# Patient Record
Sex: FEMALE | Race: WHITE | NOT HISPANIC OR LATINO | URBAN - NONMETROPOLITAN AREA
[De-identification: names, ages, dates, MRNs, and addresses within clinical notes are randomized per-mention and may not be internally consistent; named-entity substitution may affect disease eponyms.]

---

## 2019-10-03 ENCOUNTER — APPOINTMENT (RX ONLY)
Dept: URBAN - NONMETROPOLITAN AREA CLINIC 13 | Facility: CLINIC | Age: 24
Setting detail: DERMATOLOGY
End: 2019-10-03

## 2019-10-03 DIAGNOSIS — L70.8 OTHER ACNE: ICD-10-CM

## 2019-10-03 PROCEDURE — ? COUNSELING

## 2019-10-03 PROCEDURE — 99202 OFFICE O/P NEW SF 15 MIN: CPT

## 2019-10-03 PROCEDURE — ? PRESCRIPTION

## 2019-10-03 RX ORDER — SPIRONOLACTONE 25 MG/1
3 TABLET, FILM COATED ORAL DAILY
Qty: 90 | Refills: 9 | Status: ERX | COMMUNITY
Start: 2019-10-03

## 2019-10-03 RX ADMIN — SPIRONOLACTONE 3: 25 TABLET, FILM COATED ORAL at 16:22

## 2019-10-03 ASSESSMENT — LOCATION SIMPLE DESCRIPTION DERM
LOCATION SIMPLE: LEFT CHEEK
LOCATION SIMPLE: CHIN

## 2019-10-03 ASSESSMENT — LOCATION ZONE DERM: LOCATION ZONE: FACE

## 2019-10-03 ASSESSMENT — LOCATION DETAILED DESCRIPTION DERM
LOCATION DETAILED: LEFT INFERIOR CENTRAL MALAR CHEEK
LOCATION DETAILED: RIGHT CHIN

## 2019-10-03 NOTE — HPI: PIMPLES (ACNE)
How Severe Is Your Acne?: moderate
Is This A New Presentation, Or A Follow-Up?: Acne
What Type Of Note Output Would You Prefer (Optional)?: Standard Output
Additional Comments (Use Complete Sentences): PT REPORTS THAT EPIDUO HELPED BUT THAT SHE STILL FLARED AROUND HER CYCLE. SHE D/C’D MED ABOUT A YEAR AGO AND STATES THAT ACNE WORSENED.\\n\\nNOT ON AN OCP. FEELS ACNE FLARES AROUND CYCLE. HAS NEW ACNE ON HER NECK. \\n\\nIS WANTING CONSULT FOR “PATCH TESTING” AS PT FEELS ACNE COULD BE “RELATED TO A FOOD ALLERGY”

## 2019-10-03 NOTE — PROCEDURE: COUNSELING
Detail Level: Detailed
Patient Specific Counseling (Will Not Stick From Patient To Patient): COUNSELED PT THAT SHE CANNOT GET PREGNANT WHILE TAKING BRIAN AS IT CAN HAVE BIRTH DEFECTS. \\n\\nCOUNSELED PT THAT IT TAKES 4-5 MONTHS FOR BRIAN TO START WORKING